# Patient Record
Sex: FEMALE | Race: OTHER | HISPANIC OR LATINO | URBAN - METROPOLITAN AREA
[De-identification: names, ages, dates, MRNs, and addresses within clinical notes are randomized per-mention and may not be internally consistent; named-entity substitution may affect disease eponyms.]

---

## 2022-04-13 ENCOUNTER — EMERGENCY (EMERGENCY)
Facility: HOSPITAL | Age: 67
LOS: 1 days | Discharge: HOME | End: 2022-04-13
Attending: STUDENT IN AN ORGANIZED HEALTH CARE EDUCATION/TRAINING PROGRAM
Payer: COMMERCIAL

## 2022-04-13 VITALS
HEART RATE: 60 BPM | OXYGEN SATURATION: 97 % | DIASTOLIC BLOOD PRESSURE: 74 MMHG | SYSTOLIC BLOOD PRESSURE: 123 MMHG | TEMPERATURE: 98 F | RESPIRATION RATE: 17 BRPM

## 2022-04-13 VITALS
DIASTOLIC BLOOD PRESSURE: 98 MMHG | TEMPERATURE: 98 F | SYSTOLIC BLOOD PRESSURE: 171 MMHG | HEART RATE: 72 BPM | OXYGEN SATURATION: 98 % | RESPIRATION RATE: 18 BRPM

## 2022-04-13 PROCEDURE — 99284 EMERGENCY DEPT VISIT MOD MDM: CPT

## 2022-04-13 RX ORDER — DEXAMETHASONE 0.5 MG/5ML
6 ELIXIR ORAL ONCE
Refills: 0 | Status: DISCONTINUED | OUTPATIENT
Start: 2022-04-13 | End: 2022-04-13

## 2022-04-13 RX ORDER — DIPHENHYDRAMINE HCL 50 MG
50 CAPSULE ORAL ONCE
Refills: 0 | Status: COMPLETED | OUTPATIENT
Start: 2022-04-13 | End: 2022-04-13

## 2022-04-13 RX ORDER — EPINEPHRINE 0.3 MG/.3ML
0.3 INJECTION INTRAMUSCULAR; SUBCUTANEOUS
Qty: 2 | Refills: 0
Start: 2022-04-13 | End: 2022-04-13

## 2022-04-13 RX ORDER — DEXAMETHASONE 0.5 MG/5ML
8 ELIXIR ORAL ONCE
Refills: 0 | Status: COMPLETED | OUTPATIENT
Start: 2022-04-13 | End: 2022-04-13

## 2022-04-13 RX ADMIN — Medication 8 MILLIGRAM(S): at 10:20

## 2022-04-13 RX ADMIN — Medication 50 MILLIGRAM(S): at 10:14

## 2022-04-13 NOTE — ED ADULT NURSE NOTE - CAS ELECT INFOMATION PROVIDED
Pt instructed not to drive. pt is a&ox3 with steady Gait. pt reports that she will walk home, she lives near the hospital./DC instructions

## 2022-04-13 NOTE — ED PROVIDER NOTE - PATIENT PORTAL LINK FT
You can access the FollowMyHealth Patient Portal offered by [facility] by registering at the following website: [email or web address]. By joining [employer]’s FollowMyHealth portal, you will also be able to view your health information using other applications (apps) compatible with our system.

## 2022-04-13 NOTE — ED PROVIDER NOTE - NSFOLLOWUPINSTRUCTIONS_ED_ALL_ED_FT
1. Follow up with your PMD within 48-72 hours.    2. Recommend consult with an Allergist. Take Benadryl 25-50 mg every 4-6 hours for 4 days- caution drowsiness/do not drive. Carry the EPI pen with you at all times. USE if you cannot breathe, swallow or tongue swelling then come to the ER.   3. Worsening or new shortness of breath, tightness in your throat, swelling, chest pain, fever, chills, return to the ER

## 2022-04-13 NOTE — ED PROVIDER NOTE - NS ED ROS FT
Constitutional: No fever or chills  Eyes: No visual changes, eye pain or redness  HEENT: ++throat pain, -ear pain, nasal pain. No nose bleeding.  CV: No chest pain or lower extremity edema  Resp: No SOB no cough  GI: No abd pain. No nausea or vomiting. No diarrhea. No constipation.   : No dysuria, hematuria.   MSK: No musculoskeletal pain  Skin: +rash  Neuro: No headache. No numbness or tingling. No weakness.

## 2022-04-13 NOTE — ED PROVIDER NOTE - ATTENDING CONTRIBUTION TO CARE
I, Tu Whalen, performed a history and physical exam of the patient and discussed their management with the resident and/or advanced care provider. I reviewed the resident and/or advanced care provider's note and agree with the documented findings and plan of care. I was present and available for all procedures.    66yof pmhx of HTN on Norvasc and Propanolol here with lip swelling and throat discomfort since last night. pt reports episodes of intermittent allergic reactions for past 2 years, seen by multiple doctors with negative allergy testing. Reports last night after eating half a banana then having hives all over with facial/lip swelling, difficulty swallowing and talking with chest tightness. Took benadryl 50mg at 1800 then again at 400am with mild improvement. able to talk now with decrease swelling. Currently NO sob or chest tightness, No throat swelling. Denies recent trauma, fevers, chills, headache, dizziness, nausea, vomiting, dysuria, freq, hematuria, diarrhea, constipation, chest pain, shortness of breath, cough.    Spanisht speaking, Interrupter #[id number]    Well appearing and in NAD, head normal appearing atraumatic, trachea midline, no respiratory distress, lungs cta bilaterally, rrr no murmurs, soft NT ND abdomen, no visible extremity deformities, Alert and oriented, non focal neuro exam, skin warm and dry, normal affect and mood. Head: no scalp abnormalities, no signs of basilar skull fracture  Eyes: Acuity 20/20 (R); 20/20 (L), Visual fields are Full; eyes are aligned, lids, conjunctivae and sclera are normal; pupils are 3mm and equal; brisk response to light; extraocular movements intact  Ears: Outer ear without lesions, normal acuity  Nose/Sinuses: Nasal mucosa non-inflamed, Nasal Septum midline, no tenderness over maxillary or frontal sinuses, no crepitus over any facial sinuses  Mouth: Normal gums, tongue, teeth, pharynx is non erythematous, tonsils normal sized and without exudates, uvula is midline, moist mucous membranes, Upper and lower lip swelling without erythema warmth or ttp  Neck: Nontender bilateral tonsilar and anterior cervical nodes, no occipital, auricular, submandibular, submental or supraclavicular nodes, trachea in midline, thyroid lobes not palpable, no crepitus      well appearing overall without signs of airway compromise stridor shortness of breath or anaphylaxis. symptoms limited to lips and likely exposure to bananas discussed with patient to stop and Follow up with allergy prior to eating bananas again. no other travel new meds foods or new topical products or procedures to suggest other sources of rxn. rxed epi pen give steroids and antihistamines here Follow up with pmd and return precautions

## 2022-04-13 NOTE — ED PROVIDER NOTE - PHYSICAL EXAMINATION
Gen: AAO x 3, NAD; Finnish speaking- talking in full sentences.   Skin: scattered urticarial rash to neck and  upper ext.   HEENT: NC/AT, PERRLA, EOMI, MMM; +diffuse lip swelling and lower jaw swelling. NO tongue swelling. No pharyngeal swelling.   Resp: unlabored CTAB  Cardiac: rrr s1s2, no murmurs, rubs or gallops  GI: ND, +BS, Soft, NT  Ext: no pedal edema, FROM in all extremities  Neuro: no focal deficits

## 2022-04-13 NOTE — ED ADULT NURSE NOTE - GLASGOW COMA SCALE: SCORE
14 patient is focused on leaving but has not made a fomal request; should pursue endocrone consult and contineu efforts to contact past treater.

## 2022-04-13 NOTE — ED ADULT NURSE NOTE - OBJECTIVE STATEMENT
Pt presented to ed with c/o lip swelling and throat discomfort that started last night after eating half a banana. Pt has been having intermittent allergic reaction for the past 2 years. pt denies nausea, vomiting, fever, chills, sob, chest pain, headache, dizziness, throat swelling at this time. pt speak in full sentences. Will continue to monitor.

## 2022-04-13 NOTE — ED ADULT NURSE NOTE - TEMPLATE
CC:  Kd VANEGAS Hever is here today for Office Visit (Follow Up - Swelling)    Medications: medications verified and updated  Added preferred pharmacy  Refills needed today?  NO  denies Latex allergy or sensitivity  Health Maintenance Due   Topic Date Due   • Hepatitis B Vaccine (1 of 3 - Risk 3-dose series) 05/11/1988   • Diabetes Eye Exam  12/22/2018   • Shingles Vaccine (1 of 2) 05/11/2019   • Colorectal Cancer Screen-  05/11/2019       Patient is up to date, no discussion needed.          Patient would like communication of their results via:      [place]                                Abdominal Pain, N/V/D

## 2022-04-13 NOTE — ED PROVIDER NOTE - PROGRESS NOTE DETAILS
lengthy discussion with patient in Malagasy with the need to follow up with Allergist and the use of EPI pen in case of anaphylaxis and need to come to the ER for any anaphylaxis symx. pt understands. Strict return precautions advised. Case discussed with Dr. Whalen. EZEQUIEL Fong improved swelling; talking in full clear sentences. stable vitals. will discharge. -VALENTÍN Fong

## 2022-06-16 NOTE — ED PROVIDER NOTE - NS ED ATTENDING STATEMENT MOD
Subject came in today for lab draw for the [facility] study (IRB 2015.101 PI: Daniel Tang).       Patient was consented for the study on Mohini 15,2022. Labs were drawn June 16,2022 at 11:03 per protocol     This was a shared visit with the RELL. I reviewed and verified the documentation and independently performed the documented:

## 2022-08-15 PROBLEM — Z00.00 ENCOUNTER FOR PREVENTIVE HEALTH EXAMINATION: Status: ACTIVE | Noted: 2022-08-15

## 2022-08-16 ENCOUNTER — APPOINTMENT (OUTPATIENT)
Dept: GASTROENTEROLOGY | Facility: CLINIC | Age: 67
End: 2022-08-16

## 2024-09-30 NOTE — ED ADULT NURSE NOTE - NS ED NURSE DC INFO COMPLEXITY
This is a duplicate   Simple: Patient demonstrates quick and easy understanding/Verbalized Understanding

## 2024-11-27 NOTE — ED PROVIDER NOTE - OBJECTIVE STATEMENT
66yof pmhx of HTN on Norvasc and Propanolol here with lip swelling and throat discomfort since last night. pt reports episodes of intermittent allergic reactions for past 2 years, seen by multiple doctors with negative allergy testing. Reports last night after eating half a banana then having hives all over with facial/lip swelling, difficulty swallowing and talking with chest tightness. Took benadryl 50mg at 1800 then again at 400am with mild improvement. able to talk now with decrease swelling. Currently NO sob or chest tightness, No throat swelling.    Spanisht speaking, Interrupter #[id number] Is This A New Presentation, Or A Follow-Up?: Skin Lesion How Severe Is Your Skin Lesion?: mild Has Your Skin Lesion Been Treated?: not been treated

## 2025-02-17 NOTE — ED ADULT NURSE NOTE - NS PRO PASSIVE SMOKE EXP
[address]  [place] [address]  Tel.  [phone]  Fax. [phone] [address]  [place] [address]  Tel.  [phone]  Fax. [phone] [address] [place] [address]  Tel.  [phone]  Fax. [phone]     Sleep Apnea: After Your Visit  Your Care Instructions  Sleep apnea occurs when you frequently stop breathing for 10 seconds or longer during sleep. It can be mild to severe, based on the number of times per hour that you stop breathing or have slowed breathing. Blocked or narrowed airways in your nose, mouth, or throat can cause sleep apnea. Your airway can become blocked when your throat muscles and tongue relax during sleep.  Sleep apnea is common, occurring in 1 out of 20 individuals.  Individuals having any of the following characteristics should be evaluated and treated right away due to high risk and detrimental consequences from untreated sleep apnea:  Obesity  Congestive Heart failure  Atrial Fibrillation  Uncontrolled Hypertension  Type II Diabetes  Night-time Arrhythmias  Stroke  Pulmonary Hypertension  High-risk Driving Populations (pilots, truck drivers, etc.)  Patients Considering Weight-loss Surgery    How do you know you have sleep apnea?  You probably have sleep apnea if you answer 'yes' to 3 or more of the following questions:  S - Have you been told that you Snore?   T - Are you often Tired during the day?  O - Has anyone Observed you stop breathing while sleeping?  P- Do you have (or are being treated for) high blood Pressure?    B - Are you obese (Body Mass Index > 35)?  A - Is your Age 50 years old or older?  N - Is your Neck size greater than 16 inches?  G - Are you male Gender?  A sleep physician can prescribe a breathing device that prevents tissues in the throat from blocking your airway. Or your doctor may recommend using a dental device (oral breathing device) to help keep your airway open. In some cases, surgery may  Unknown